# Patient Record
Sex: MALE | Race: WHITE | NOT HISPANIC OR LATINO
[De-identification: names, ages, dates, MRNs, and addresses within clinical notes are randomized per-mention and may not be internally consistent; named-entity substitution may affect disease eponyms.]

---

## 2019-03-04 PROBLEM — Z00.00 ENCOUNTER FOR PREVENTIVE HEALTH EXAMINATION: Status: ACTIVE | Noted: 2019-03-04

## 2019-03-21 ENCOUNTER — APPOINTMENT (OUTPATIENT)
Dept: SURGERY | Facility: CLINIC | Age: 44
End: 2019-03-21
Payer: COMMERCIAL

## 2019-03-21 ENCOUNTER — APPOINTMENT (OUTPATIENT)
Dept: SURGERY | Facility: CLINIC | Age: 44
End: 2019-03-21

## 2019-03-21 VITALS
BODY MASS INDEX: 32.37 KG/M2 | DIASTOLIC BLOOD PRESSURE: 84 MMHG | SYSTOLIC BLOOD PRESSURE: 129 MMHG | HEIGHT: 72 IN | WEIGHT: 239 LBS

## 2019-03-21 DIAGNOSIS — K43.2 INCISIONAL HERNIA W/OUT OBSTRUCTION OR GANGRENE: ICD-10-CM

## 2019-03-21 DIAGNOSIS — Z78.9 OTHER SPECIFIED HEALTH STATUS: ICD-10-CM

## 2019-03-21 DIAGNOSIS — Z86.39 PERSONAL HISTORY OF OTHER ENDOCRINE, NUTRITIONAL AND METABOLIC DISEASE: ICD-10-CM

## 2019-03-21 DIAGNOSIS — Z87.19 PERSONAL HISTORY OF OTHER DISEASES OF THE DIGESTIVE SYSTEM: ICD-10-CM

## 2019-03-21 PROCEDURE — 99203 OFFICE O/P NEW LOW 30 MIN: CPT

## 2019-03-22 PROBLEM — Z78.9 CAFFEINE USE: Status: ACTIVE | Noted: 2019-03-21

## 2019-03-22 PROBLEM — Z87.19 HISTORY OF BILIARY COLIC: Status: ACTIVE | Noted: 2019-03-22

## 2019-03-22 PROBLEM — Z78.9 NON-SMOKER: Status: ACTIVE | Noted: 2019-03-21

## 2019-03-22 PROBLEM — K43.2 INCISIONAL HERNIA OF ANTERIOR ABDOMINAL WALL WITHOUT OBSTRUCTION OR GANGRENE: Status: ACTIVE | Noted: 2019-03-22

## 2019-03-22 PROBLEM — Z86.39 HISTORY OF HIGH CHOLESTEROL: Status: ACTIVE | Noted: 2019-03-21

## 2019-03-22 RX ORDER — ASPIRIN ENTERIC COATED TABLETS 81 MG 81 MG/1
81 TABLET, DELAYED RELEASE ORAL
Refills: 0 | Status: ACTIVE | COMMUNITY

## 2019-03-22 NOTE — HISTORY OF PRESENT ILLNESS
[de-identified] : The patient comes with his wife for evaluation of a hernia at the site of his prior robotic cholecystectomy which was done about 1-1/2 years ago by Dr. Mcdaniel at [facility].  He noticed this bulge about 2 months after surgery and it has enlarged slightly since that. He does not report any other postoperative problems such as wound infection or hematoma. There has been no recent weight loss or change in the bowel habit.\par \par He also reports that right flank pain with fatty food intake that he had before surgery is persisting. He has no history of any other GI problems other than some ulcer disease in his late teens for which he took Zantac.

## 2019-03-22 NOTE — PHYSICAL EXAM
[Normal Thyroid] : the thyroid was normal [Normal Breath Sounds] : Normal breath sounds [Normal Heart Sounds] : normal heart sounds [Abdominal Masses] : No abdominal masses [Abdomen Tenderness] : ~T ~M No abdominal tenderness [No HSM] : no hepatosplenomegaly [de-identified] : anicteric and healthy in appearance [de-identified] : no adenopathy [de-identified] : Protuberant but soft. Healed midline incision at the umbilicus. There is a freely reducible incisional hernia at the upper end of this incision with a 1.5-2 cm fascial defect and a 4 cm sac. There are no local acute changes. No inguinal or femoral hernias are noted. Cheek sign negative. No right upper quadrant right flank or CVA tenderness noted. [de-identified] : 2 CM subcutaneous lipoma of the right lower back, stable and asymptomatic for 20 years as per the patient.

## 2019-03-22 NOTE — ASSESSMENT
[FreeTextEntry1] : Incisional hernia repair as noted above, for which elective repair, possibly with mesh, was advised, so as to try to avoid the possible complications of an untreated hernia, which were explained. The procedure was explained in full with its risks and benefits and all their questions were answered.  \par \par In addition, the patient still has biliary symptoms following elective cholecystectomy and further evaluation prior to the hernia repair is advised. A sonogram of the abdomen has been requested along with a CMP. In light of his prior GI history he was advised to have a GI consult on referral from his PCP. He was also asked to obtain the prior reports of his surgery and pathology from the robotic cholecystectomy. He will return after the above has been completed for reevaluation and further management as appropriate.\par \par Appropriate precautions and warning signs were advised for the interim. They understand and agree and will proceed as outlined above.

## 2019-03-27 ENCOUNTER — FORM ENCOUNTER (OUTPATIENT)
Age: 44
End: 2019-03-27

## 2019-03-28 ENCOUNTER — OUTPATIENT (OUTPATIENT)
Dept: OUTPATIENT SERVICES | Facility: HOSPITAL | Age: 44
LOS: 1 days | Discharge: HOME | End: 2019-03-28

## 2019-03-28 DIAGNOSIS — R10.9 UNSPECIFIED ABDOMINAL PAIN: ICD-10-CM

## 2019-05-28 ENCOUNTER — OUTPATIENT (OUTPATIENT)
Dept: OUTPATIENT SERVICES | Facility: HOSPITAL | Age: 44
LOS: 1 days | Discharge: HOME | End: 2019-05-28
Payer: COMMERCIAL

## 2019-05-28 DIAGNOSIS — N28.1 CYST OF KIDNEY, ACQUIRED: ICD-10-CM

## 2019-05-28 PROCEDURE — 74170 CT ABD WO CNTRST FLWD CNTRST: CPT | Mod: 26

## 2019-06-07 ENCOUNTER — APPOINTMENT (OUTPATIENT)
Dept: UROLOGY | Facility: CLINIC | Age: 44
End: 2019-06-07
Payer: COMMERCIAL

## 2019-06-07 VITALS
DIASTOLIC BLOOD PRESSURE: 76 MMHG | SYSTOLIC BLOOD PRESSURE: 136 MMHG | HEART RATE: 66 BPM | WEIGHT: 239 LBS | BODY MASS INDEX: 32.37 KG/M2 | HEIGHT: 72 IN

## 2019-06-07 DIAGNOSIS — N28.1 CYST OF KIDNEY, ACQUIRED: ICD-10-CM

## 2019-06-07 DIAGNOSIS — N20.0 CALCULUS OF KIDNEY: ICD-10-CM

## 2019-06-07 PROCEDURE — 99203 OFFICE O/P NEW LOW 30 MIN: CPT

## 2019-06-07 NOTE — LETTER BODY
[Dear  ___] : Dear  [unfilled], [Please see my note below.] : Please see my note below. [Consult Letter:] : I had the pleasure of evaluating your patient, [unfilled]. [Consult Closing:] : Thank you very much for allowing me to participate in the care of this patient.  If you have any questions, please do not hesitate to contact me. [Sincerely,] : Sincerely, [FreeTextEntry2] : Dr. Mj Back\par [address]\[place] [address]

## 2019-06-07 NOTE — LETTER HEADER
[FreeTextEntry3] : Daniela Sanchez M.D.\par Director of Urology\par [facility]\par [address] [place] [address], [address]\[place] [address]

## 2019-06-07 NOTE — HISTORY OF PRESENT ILLNESS
[FreeTextEntry1] : Zheng is a 43-year-old male who had some vague right-sided pain sometimes worse after eating. He saw Dr. Mcdaniel who recommended a cholecystectomy because gallstones were found though there were no radiologic signs of cholecystitis. The operation was performed unfortunately has an incisional hernia and he still has the pain. He saw Dr. Cabrera who sent him to a gastroenterologist who sent him for an ultrasound after which she was sent for a CAT scan. Based on the findings he was sent here for consultation regarding a complex left renal cyst. The left sided cyst found incidentally on the renal sonogram. After that he was sent for CT scan, pre-/post contrast, which was done on May 28, 2019 and revealed a 5.8 cm left Bosniak 2 renal cyst with a small peripheral, to me who left posterolateral thin septation. It also noted a left lower pole but I see bilateral 4 mm renal calculi lower pole stones.\par \par Mr. Roberson acknowledges that he has a family history of renal stones, in his brother, however he denies having any stone  issues  in the past.\par \par  [2] : 2 [Right Flank] : right flank [None] : There is no radiation [Aching] : aching

## 2019-06-07 NOTE — PHYSICAL EXAM
[General Appearance - Well Developed] : well developed [Normal Appearance] : normal appearance [General Appearance - Well Nourished] : well nourished [Well Groomed] : well groomed [Edema] : no peripheral edema [General Appearance - In No Acute Distress] : no acute distress [Respiration, Rhythm And Depth] : normal respiratory rhythm and effort [Exaggerated Use Of Accessory Muscles For Inspiration] : no accessory muscle use [Auscultation Breath Sounds / Voice Sounds] : lungs were clear to auscultation bilaterally [Abdomen Soft] : soft [Bowel Sounds] : normal bowel sounds [Abdomen Tenderness] : non-tender [Costovertebral Angle Tenderness] : no ~M costovertebral angle tenderness [Normal Station and Gait] : the gait and station were normal for the patient's age [] : no rash [No Focal Deficits] : no focal deficits [Oriented To Time, Place, And Person] : oriented to person, place, and time [Mood] : the mood was normal [Affect] : the affect was normal [Not Anxious] : not anxious [FreeTextEntry1] : Range of motion against resistance did not reproduce his pain

## 2019-06-07 NOTE — ASSESSMENT
[FreeTextEntry1] : His renal cyst is a Bosniak category 2 cyst and can be watched. I went over the films with him and explain the reasons why we watch a septated cyst but that given the current findings intervention is not indicated. He is aware that it is a rather large cyst and it is splaying the anterior upper portion of his kidney and may develop problems just based on size. If that happens he may end up with a decortication. Again however that is not something that is currently plan. For now, We'll obtain a renal sonogram in 6 months.\par \par Concerning his bilateral stones, especially given the family history implying that there’s some genetic background though the two brothers may have similar dietary habits, he will obtain a metabolic workup times two and follow up for review.\par Depending on the findings we may consider concurrent care with a nephrologist. He’s aware that the stones can fall out at any time and though they are small enough to pass they are more than large enough to bother him.\par \par Dietary habits that can contribute to stones such as cola, dehydration, and various food products were discussed but for now he will stay with his current diet so we can try and find out what is the problem so we can make definitive recommendations. He is aware that in patients who have stones there is a very high 10 year recurrence rate in patients who were not compliant with therapy.\par

## 2019-09-18 ENCOUNTER — APPOINTMENT (OUTPATIENT)
Dept: UROLOGY | Facility: CLINIC | Age: 44
End: 2019-09-18

## 2025-05-07 ENCOUNTER — NON-APPOINTMENT (OUTPATIENT)
Age: 50
End: 2025-05-07

## 2025-05-07 ENCOUNTER — APPOINTMENT (OUTPATIENT)
Facility: CLINIC | Age: 50
End: 2025-05-07
Payer: COMMERCIAL

## 2025-05-07 ENCOUNTER — APPOINTMENT (OUTPATIENT)
Dept: SURGERY | Facility: CLINIC | Age: 50
End: 2025-05-07
Payer: COMMERCIAL

## 2025-05-07 VITALS
DIASTOLIC BLOOD PRESSURE: 86 MMHG | OXYGEN SATURATION: 98 % | TEMPERATURE: 98.1 F | SYSTOLIC BLOOD PRESSURE: 160 MMHG | WEIGHT: 241 LBS | HEIGHT: 72 IN | BODY MASS INDEX: 32.64 KG/M2 | HEART RATE: 72 BPM

## 2025-05-07 VITALS
SYSTOLIC BLOOD PRESSURE: 133 MMHG | WEIGHT: 241 LBS | RESPIRATION RATE: 16 BRPM | DIASTOLIC BLOOD PRESSURE: 90 MMHG | OXYGEN SATURATION: 97 % | BODY MASS INDEX: 32.64 KG/M2 | HEIGHT: 72 IN | HEART RATE: 75 BPM

## 2025-05-07 DIAGNOSIS — Z98.890 OTHER SPECIFIED POSTPROCEDURAL STATES: ICD-10-CM

## 2025-05-07 DIAGNOSIS — K42.9 UMBILICAL HERNIA W/OUT OBSTRUCTION OR GANGRENE: ICD-10-CM

## 2025-05-07 DIAGNOSIS — K40.90 UNILATERAL INGUINAL HERNIA, W/OUT OBSTRUCTION OR GANGRENE, NOT SPECIFIED AS RECURRENT: ICD-10-CM

## 2025-05-07 DIAGNOSIS — K43.2 INCISIONAL HERNIA W/OUT OBSTRUCTION OR GANGRENE: ICD-10-CM

## 2025-05-07 DIAGNOSIS — Z86.39 PERSONAL HISTORY OF OTHER ENDOCRINE, NUTRITIONAL AND METABOLIC DISEASE: ICD-10-CM

## 2025-05-07 DIAGNOSIS — Z87.19 OTHER SPECIFIED POSTPROCEDURAL STATES: ICD-10-CM

## 2025-05-07 DIAGNOSIS — R73.03 PREDIABETES.: ICD-10-CM

## 2025-05-07 PROCEDURE — 99204 OFFICE O/P NEW MOD 45 MIN: CPT

## 2025-05-07 PROCEDURE — ZZZZZ: CPT

## 2025-05-20 ENCOUNTER — OUTPATIENT (OUTPATIENT)
Dept: OUTPATIENT SERVICES | Facility: HOSPITAL | Age: 50
LOS: 1 days | End: 2025-05-20
Payer: COMMERCIAL

## 2025-05-20 DIAGNOSIS — K42.9 UMBILICAL HERNIA WITHOUT OBSTRUCTION OR GANGRENE: ICD-10-CM

## 2025-05-20 PROCEDURE — 93005 ELECTROCARDIOGRAM TRACING: CPT

## 2025-05-20 PROCEDURE — 93010 ELECTROCARDIOGRAM REPORT: CPT

## 2025-05-21 DIAGNOSIS — K42.9 UMBILICAL HERNIA WITHOUT OBSTRUCTION OR GANGRENE: ICD-10-CM

## 2025-05-27 ENCOUNTER — NON-APPOINTMENT (OUTPATIENT)
Age: 50
End: 2025-05-27

## 2025-05-27 NOTE — ASU PATIENT PROFILE, ADULT - NSICDXPASTMEDICALHX_GEN_ALL_CORE_FT
none
PAST MEDICAL HISTORY:  H/O vertigo Past HX    Kidney stone 2022    TINA (obstructive sleep apnea)

## 2025-05-27 NOTE — ASU PATIENT PROFILE, ADULT - NSICDXPASTSURGICALHX_GEN_ALL_CORE_FT
PAST SURGICAL HISTORY:  H/O umbilical hernia repair     S/P cholecystectomy      PAST SURGICAL HISTORY:  H/O umbilical hernia repair     S/P arthroscopic reconstruction of ACL of right knee using quadriceps tendon autograft     S/P cholecystectomy

## 2025-05-27 NOTE — ASU PATIENT PROFILE, ADULT - FALL HARM RISK - UNIVERSAL INTERVENTIONS
Bed in lowest position, wheels locked, appropriate side rails in place/Call bell, personal items and telephone in reach/Instruct patient to call for assistance before getting out of bed or chair/Non-slip footwear when patient is out of bed/[place] to call system/Physically safe environment - no spills, clutter or unnecessary equipment/Purposeful Proactive Rounding/Room/bathroom lighting operational, light cord in reach

## 2025-05-28 ENCOUNTER — RESULT REVIEW (OUTPATIENT)
Age: 50
End: 2025-05-28

## 2025-05-28 ENCOUNTER — OUTPATIENT (OUTPATIENT)
Dept: OUTPATIENT SERVICES | Facility: HOSPITAL | Age: 50
LOS: 1 days | Discharge: HOME | End: 2025-05-28
Payer: COMMERCIAL

## 2025-05-28 ENCOUNTER — TRANSCRIPTION ENCOUNTER (OUTPATIENT)
Age: 50
End: 2025-05-28

## 2025-05-28 ENCOUNTER — APPOINTMENT (OUTPATIENT)
Dept: SURGERY | Facility: AMBULATORY SURGERY CENTER | Age: 50
End: 2025-05-28

## 2025-05-28 VITALS
HEART RATE: 85 BPM | SYSTOLIC BLOOD PRESSURE: 120 MMHG | OXYGEN SATURATION: 98 % | DIASTOLIC BLOOD PRESSURE: 72 MMHG | RESPIRATION RATE: 17 BRPM

## 2025-05-28 VITALS
OXYGEN SATURATION: 99 % | HEART RATE: 64 BPM | HEIGHT: 72 IN | TEMPERATURE: 99 F | SYSTOLIC BLOOD PRESSURE: 144 MMHG | RESPIRATION RATE: 15 BRPM | WEIGHT: 238.1 LBS | DIASTOLIC BLOOD PRESSURE: 88 MMHG

## 2025-05-28 DIAGNOSIS — Z98.890 OTHER SPECIFIED POSTPROCEDURAL STATES: Chronic | ICD-10-CM

## 2025-05-28 DIAGNOSIS — Z90.49 ACQUIRED ABSENCE OF OTHER SPECIFIED PARTS OF DIGESTIVE TRACT: Chronic | ICD-10-CM

## 2025-05-28 PROCEDURE — 88304 TISSUE EXAM BY PATHOLOGIST: CPT | Mod: 26

## 2025-05-28 PROCEDURE — 15734 MUSCLE-SKIN GRAFT TRUNK: CPT

## 2025-05-28 PROCEDURE — 15734 MUSCLE-SKIN GRAFT TRUNK: CPT | Mod: AS

## 2025-05-28 PROCEDURE — 49616 RPR AA HRN RCR 3-10 NCR/STRN: CPT

## 2025-05-28 PROCEDURE — 49616 RPR AA HRN RCR 3-10 NCR/STRN: CPT | Mod: AS

## 2025-05-28 DEVICE — SURGCEL 4 X 8": Type: IMPLANTABLE DEVICE | Status: FUNCTIONAL

## 2025-05-28 DEVICE — IMPLANTABLE DEVICE: Type: IMPLANTABLE DEVICE | Status: FUNCTIONAL

## 2025-05-28 DEVICE — MESH HERNIA INGUINAL 3DMAX EXTRA LARGE 12 X 17CM LEFT: Type: IMPLANTABLE DEVICE | Status: FUNCTIONAL

## 2025-05-28 DEVICE — MESH PROGRIP LAP SELF FIX 15X15CM: Type: IMPLANTABLE DEVICE | Status: FUNCTIONAL

## 2025-05-28 DEVICE — MESH HERNIA INGUINAL 3DMAX EXTRA LARGE 12 X 17CM RIGHT: Type: IMPLANTABLE DEVICE | Status: FUNCTIONAL

## 2025-05-28 RX ORDER — FENTANYL CITRATE-0.9 % NACL/PF 100MCG/2ML
25 SYRINGE (ML) INTRAVENOUS
Refills: 0 | Status: DISCONTINUED | OUTPATIENT
Start: 2025-05-28 | End: 2025-05-28

## 2025-05-28 RX ORDER — OXYCODONE HYDROCHLORIDE 30 MG/1
1 TABLET ORAL
Qty: 12 | Refills: 0
Start: 2025-05-28

## 2025-05-28 RX ORDER — ONDANSETRON HCL/PF 4 MG/2 ML
4 VIAL (ML) INJECTION ONCE
Refills: 0 | Status: DISCONTINUED | OUTPATIENT
Start: 2025-05-28 | End: 2025-05-28

## 2025-05-28 RX ORDER — SODIUM CHLORIDE 9 G/1000ML
500 INJECTION, SOLUTION INTRAVENOUS
Refills: 0 | Status: DISCONTINUED | OUTPATIENT
Start: 2025-05-28 | End: 2025-05-28

## 2025-05-28 RX ORDER — DOCUSATE SODIUM 100 MG
1 CAPSULE ORAL
Qty: 20 | Refills: 0
Start: 2025-05-28

## 2025-05-28 RX ORDER — IBUPROFEN 200 MG
800 TABLET ORAL ONCE
Refills: 0 | Status: COMPLETED | OUTPATIENT
Start: 2025-05-28 | End: 2025-05-28

## 2025-05-28 RX ADMIN — Medication 25 MICROGRAM(S): at 12:45

## 2025-05-28 RX ADMIN — Medication 800 MILLIGRAM(S): at 13:15

## 2025-05-28 RX ADMIN — Medication 800 MILLIGRAM(S): at 12:45

## 2025-05-28 RX ADMIN — Medication 25 MICROGRAM(S): at 12:15

## 2025-05-28 NOTE — ASU DISCHARGE PLAN (ADULT/PEDIATRIC) - CARE PROVIDER_API CALL
Sarah Dawn, [facility]  [address], [address]  [place] [address]  Phone: [phone]  Fax: [phone]  Follow Up Time:

## 2025-05-28 NOTE — BRIEF OPERATIVE NOTE - NSICDXBRIEFPOSTOP_GEN_ALL_CORE_FT
POST-OP DIAGNOSIS:  Incisional hernia without obstruction or gangrene 28-May-2025 12:24:09  Edy Espana  Other specified postprocedural state 28-May-2025 12:23:57  Edy Espana

## 2025-05-28 NOTE — BRIEF OPERATIVE NOTE - OPERATION/FINDINGS
Time out  5mm optical entrance at LUQ. Pre-peritoneal space dissected. 2 additional 8mm trocars placed under direct visualization (DV). Ventral hernia identified measuring 8cm x 6 cm.  Hernia space dissected. Hernia repaired with stratifix and quill sutures. Mesh placed and secured with vicryl sutures via grainy needle. Abdomen desufflated ensuring proper mesh placement. Skin closed with 4-0 Monocryl and Dermabond.  Abdominal binder placed.   Pt was extubated and taken to recovery in stable condition.  See full dictation

## 2025-05-28 NOTE — ASU DISCHARGE PLAN (ADULT/PEDIATRIC) - FINANCIAL ASSISTANCE
[facility] provides services at a reduced cost to those who are determined to be eligible through [facility]’s financial assistance program. Information regarding [facility]’s financial assistance program can be found by going to [email or web address] or by calling [phone].

## 2025-05-28 NOTE — ASU DISCHARGE PLAN (ADULT/PEDIATRIC) - ASU DC SPECIAL INSTRUCTIONSFT
You may shower. Do not scrub your incisions. Rinse them with soap and water and pat them dry.  Take Tylenol and/or Ibuprofen as directed on the bottle.  Take Oxycodone as needed for breakthrough pain. Take Colace while taking Oxycodone to prevent straining/constipation.  Your prescriptions were sent to the [facility] on [address] in [place].  Follow up with Dr. Smith in 1-2 weeks.

## 2025-05-28 NOTE — BRIEF OPERATIVE NOTE - NSICDXBRIEFPREOP_GEN_ALL_CORE_FT
PRE-OP DIAGNOSIS:  Incisional hernia without obstruction or gangrene 28-May-2025 12:23:26  Edy Espana  Other specified postprocedural state 28-May-2025 12:23:44  Edy Espana

## 2025-06-03 PROBLEM — N20.0 CALCULUS OF KIDNEY: Chronic | Status: ACTIVE | Noted: 2025-05-28

## 2025-06-03 PROBLEM — Z87.898 PERSONAL HISTORY OF OTHER SPECIFIED CONDITIONS: Chronic | Status: ACTIVE | Noted: 2025-05-28

## 2025-06-03 PROBLEM — G47.33 OBSTRUCTIVE SLEEP APNEA (ADULT) (PEDIATRIC): Chronic | Status: ACTIVE | Noted: 2025-05-28

## 2025-06-11 ENCOUNTER — APPOINTMENT (OUTPATIENT)
Facility: CLINIC | Age: 50
End: 2025-06-11
Payer: COMMERCIAL

## 2025-06-11 VITALS
WEIGHT: 240 LBS | HEIGHT: 72 IN | HEART RATE: 76 BPM | BODY MASS INDEX: 32.51 KG/M2 | DIASTOLIC BLOOD PRESSURE: 91 MMHG | SYSTOLIC BLOOD PRESSURE: 138 MMHG | TEMPERATURE: 97.3 F | OXYGEN SATURATION: 96 %

## 2025-06-11 PROCEDURE — 99024 POSTOP FOLLOW-UP VISIT: CPT

## 2025-06-18 ENCOUNTER — APPOINTMENT (OUTPATIENT)
Facility: CLINIC | Age: 50
End: 2025-06-18

## (undated) DEVICE — SUSPENSORY WITH LEG STRAPS LARGE

## (undated) DEVICE — SYR ASEPTO

## (undated) DEVICE — WARMING BLANKET LOWER ADULT

## (undated) DEVICE — DRAPE TOP SHEET 53" X 101"

## (undated) DEVICE — SUT PDO 2-0 1/2 CIRCLE 26MM NDL 15CM

## (undated) DEVICE — XI SEAL UNIVERSIAL 5-12MM

## (undated) DEVICE — Device

## (undated) DEVICE — TUBING STRYKER PNEUMOCLEAR SMOKE EVACUATION HIGH FLOW

## (undated) DEVICE — SUT MONOCRYL 4-0 27" PS-2 UNDYED

## (undated) DEVICE — DRAPE TOWEL BLUE 17" X 24"

## (undated) DEVICE — TROCAR APPLIED MEDICAL KII FIOS FIRST ENTRY 5MM X 100MM Z-THREAD

## (undated) DEVICE — XI ARM FORCEP FENESTRATED BIPOLAR 8MM

## (undated) DEVICE — GOWN ROYAL SILK XL

## (undated) DEVICE — DRAPE 1/2 SHEET 40X57"

## (undated) DEVICE — GLV 8 PROTEXIS (WHITE)

## (undated) DEVICE — SUPP ATHLETIC MALE XLG 44-55IN

## (undated) DEVICE — XI DRAPE ARM

## (undated) DEVICE — XI 12MM AND STAPLER CANNULA SEAL

## (undated) DEVICE — STAPLER SKIN VISI-STAT 35 WIDE

## (undated) DEVICE — VENODYNE/SCD SLEEVE CALF MEDIUM

## (undated) DEVICE — DRSG DERMABOND 0.7ML

## (undated) DEVICE — XI SCISSOR TIP COVER

## (undated) DEVICE — SUT PDO 0 1/2 CIRCLE 22MM NDL 20CM

## (undated) DEVICE — TIP METZENBAUM SCISSOR MONOPOLAR ENDOCUT (ORANGE)

## (undated) DEVICE — NDL GRASPING DISP

## (undated) DEVICE — XI DRAPE COLUMN

## (undated) DEVICE — XI OBTURATOR OPTICAL BLADELESS 8MM

## (undated) DEVICE — DRAPE 3/4 SHEET 52X76"

## (undated) DEVICE — D HELP - CLEARVIEW CLEARIFY SYSTEM

## (undated) DEVICE — SYR LUER LOK 30CC

## (undated) DEVICE — MARKING PEN W RULER